# Patient Record
Sex: FEMALE | Race: WHITE | NOT HISPANIC OR LATINO
[De-identification: names, ages, dates, MRNs, and addresses within clinical notes are randomized per-mention and may not be internally consistent; named-entity substitution may affect disease eponyms.]

---

## 2017-10-06 ENCOUNTER — RESULT REVIEW (OUTPATIENT)
Age: 32
End: 2017-10-06

## 2017-12-27 ENCOUNTER — RESULT REVIEW (OUTPATIENT)
Age: 32
End: 2017-12-27

## 2019-12-29 ENCOUNTER — INPATIENT (INPATIENT)
Facility: HOSPITAL | Age: 34
LOS: 1 days | Discharge: HOME | End: 2019-12-31
Attending: OBSTETRICS & GYNECOLOGY | Admitting: OBSTETRICS & GYNECOLOGY
Payer: COMMERCIAL

## 2019-12-29 VITALS — HEIGHT: 68 IN | WEIGHT: 233.69 LBS

## 2019-12-29 DIAGNOSIS — O26.899 OTHER SPECIFIED PREGNANCY RELATED CONDITIONS, UNSPECIFIED TRIMESTER: ICD-10-CM

## 2019-12-29 DIAGNOSIS — Z3A.00 WEEKS OF GESTATION OF PREGNANCY NOT SPECIFIED: ICD-10-CM

## 2019-12-29 DIAGNOSIS — Z34.80 ENCOUNTER FOR SUPERVISION OF OTHER NORMAL PREGNANCY, UNSPECIFIED TRIMESTER: ICD-10-CM

## 2019-12-29 LAB
BASOPHILS # BLD AUTO: 0.02 K/UL — SIGNIFICANT CHANGE UP (ref 0–0.2)
BASOPHILS NFR BLD AUTO: 0.2 % — SIGNIFICANT CHANGE UP (ref 0–2)
BLD GP AB SCN SERPL QL: NEGATIVE — SIGNIFICANT CHANGE UP
EOSINOPHIL # BLD AUTO: 0.19 K/UL — SIGNIFICANT CHANGE UP (ref 0–0.5)
EOSINOPHIL NFR BLD AUTO: 2.4 % — SIGNIFICANT CHANGE UP (ref 0–6)
HCT VFR BLD CALC: 33.6 % — LOW (ref 34.5–45)
HGB BLD-MCNC: 10.9 G/DL — LOW (ref 11.5–15.5)
IMM GRANULOCYTES NFR BLD AUTO: 0.5 % — SIGNIFICANT CHANGE UP (ref 0–1.5)
LYMPHOCYTES # BLD AUTO: 1.28 K/UL — SIGNIFICANT CHANGE UP (ref 1–3.3)
LYMPHOCYTES # BLD AUTO: 15.9 % — SIGNIFICANT CHANGE UP (ref 13–44)
MCHC RBC-ENTMCNC: 30 PG — SIGNIFICANT CHANGE UP (ref 27–34)
MCHC RBC-ENTMCNC: 32.4 GM/DL — SIGNIFICANT CHANGE UP (ref 32–36)
MCV RBC AUTO: 92.6 FL — SIGNIFICANT CHANGE UP (ref 80–100)
MONOCYTES # BLD AUTO: 0.87 K/UL — SIGNIFICANT CHANGE UP (ref 0–0.9)
MONOCYTES NFR BLD AUTO: 10.8 % — SIGNIFICANT CHANGE UP (ref 2–14)
NEUTROPHILS # BLD AUTO: 5.63 K/UL — SIGNIFICANT CHANGE UP (ref 1.8–7.4)
NEUTROPHILS NFR BLD AUTO: 70.2 % — SIGNIFICANT CHANGE UP (ref 43–77)
NRBC # BLD: 0 /100 WBCS — SIGNIFICANT CHANGE UP (ref 0–0)
PLATELET # BLD AUTO: 214 K/UL — SIGNIFICANT CHANGE UP (ref 150–400)
RBC # BLD: 3.63 M/UL — LOW (ref 3.8–5.2)
RBC # FLD: 14.6 % — HIGH (ref 10.3–14.5)
RH IG SCN BLD-IMP: NEGATIVE — SIGNIFICANT CHANGE UP
WBC # BLD: 8.03 K/UL — SIGNIFICANT CHANGE UP (ref 3.8–10.5)
WBC # FLD AUTO: 8.03 K/UL — SIGNIFICANT CHANGE UP (ref 3.8–10.5)

## 2019-12-29 RX ORDER — SODIUM CHLORIDE 9 MG/ML
1000 INJECTION, SOLUTION INTRAVENOUS
Refills: 0 | Status: DISCONTINUED | OUTPATIENT
Start: 2019-12-29 | End: 2019-12-30

## 2019-12-29 RX ORDER — DIBUCAINE 1 %
1 OINTMENT (GRAM) RECTAL EVERY 6 HOURS
Refills: 0 | Status: DISCONTINUED | OUTPATIENT
Start: 2019-12-29 | End: 2019-12-31

## 2019-12-29 RX ORDER — HYDROCORTISONE 1 %
1 OINTMENT (GRAM) TOPICAL EVERY 6 HOURS
Refills: 0 | Status: DISCONTINUED | OUTPATIENT
Start: 2019-12-29 | End: 2019-12-31

## 2019-12-29 RX ORDER — OXYTOCIN 10 UNIT/ML
333.33 VIAL (ML) INJECTION
Qty: 20 | Refills: 0 | Status: DISCONTINUED | OUTPATIENT
Start: 2019-12-29 | End: 2019-12-31

## 2019-12-29 RX ORDER — GLYCERIN ADULT
1 SUPPOSITORY, RECTAL RECTAL AT BEDTIME
Refills: 0 | Status: DISCONTINUED | OUTPATIENT
Start: 2019-12-29 | End: 2019-12-31

## 2019-12-29 RX ORDER — SODIUM CHLORIDE 9 MG/ML
3 INJECTION INTRAMUSCULAR; INTRAVENOUS; SUBCUTANEOUS EVERY 8 HOURS
Refills: 0 | Status: DISCONTINUED | OUTPATIENT
Start: 2019-12-29 | End: 2019-12-31

## 2019-12-29 RX ORDER — AER TRAVELER 0.5 G/1
1 SOLUTION RECTAL; TOPICAL EVERY 4 HOURS
Refills: 0 | Status: DISCONTINUED | OUTPATIENT
Start: 2019-12-29 | End: 2019-12-31

## 2019-12-29 RX ORDER — LANOLIN
1 OINTMENT (GRAM) TOPICAL EVERY 6 HOURS
Refills: 0 | Status: DISCONTINUED | OUTPATIENT
Start: 2019-12-29 | End: 2019-12-31

## 2019-12-29 RX ORDER — OXYCODONE HYDROCHLORIDE 5 MG/1
5 TABLET ORAL
Refills: 0 | Status: DISCONTINUED | OUTPATIENT
Start: 2019-12-29 | End: 2019-12-31

## 2019-12-29 RX ORDER — KETOROLAC TROMETHAMINE 30 MG/ML
30 SYRINGE (ML) INJECTION ONCE
Refills: 0 | Status: DISCONTINUED | OUTPATIENT
Start: 2019-12-29 | End: 2019-12-29

## 2019-12-29 RX ORDER — TETANUS TOXOID, REDUCED DIPHTHERIA TOXOID AND ACELLULAR PERTUSSIS VACCINE, ADSORBED 5; 2.5; 8; 8; 2.5 [IU]/.5ML; [IU]/.5ML; UG/.5ML; UG/.5ML; UG/.5ML
0.5 SUSPENSION INTRAMUSCULAR ONCE
Refills: 0 | Status: DISCONTINUED | OUTPATIENT
Start: 2019-12-29 | End: 2019-12-31

## 2019-12-29 RX ORDER — PRAMOXINE HYDROCHLORIDE 150 MG/15G
1 AEROSOL, FOAM RECTAL EVERY 4 HOURS
Refills: 0 | Status: DISCONTINUED | OUTPATIENT
Start: 2019-12-29 | End: 2019-12-31

## 2019-12-29 RX ORDER — OXYTOCIN 10 UNIT/ML
2 VIAL (ML) INJECTION
Qty: 30 | Refills: 0 | Status: DISCONTINUED | OUTPATIENT
Start: 2019-12-29 | End: 2019-12-31

## 2019-12-29 RX ORDER — IBUPROFEN 200 MG
600 TABLET ORAL EVERY 6 HOURS
Refills: 0 | Status: COMPLETED | OUTPATIENT
Start: 2019-12-29 | End: 2020-11-26

## 2019-12-29 RX ORDER — DIPHENHYDRAMINE HCL 50 MG
25 CAPSULE ORAL EVERY 6 HOURS
Refills: 0 | Status: DISCONTINUED | OUTPATIENT
Start: 2019-12-29 | End: 2019-12-31

## 2019-12-29 RX ORDER — ACETAMINOPHEN 500 MG
975 TABLET ORAL
Refills: 0 | Status: DISCONTINUED | OUTPATIENT
Start: 2019-12-29 | End: 2019-12-31

## 2019-12-29 RX ORDER — SIMETHICONE 80 MG/1
80 TABLET, CHEWABLE ORAL EVERY 4 HOURS
Refills: 0 | Status: DISCONTINUED | OUTPATIENT
Start: 2019-12-29 | End: 2019-12-31

## 2019-12-29 RX ORDER — CITRIC ACID/SODIUM CITRATE 300-500 MG
15 SOLUTION, ORAL ORAL EVERY 6 HOURS
Refills: 0 | Status: DISCONTINUED | OUTPATIENT
Start: 2019-12-29 | End: 2019-12-30

## 2019-12-29 RX ORDER — BENZOCAINE 10 %
1 GEL (GRAM) MUCOUS MEMBRANE EVERY 6 HOURS
Refills: 0 | Status: DISCONTINUED | OUTPATIENT
Start: 2019-12-29 | End: 2019-12-31

## 2019-12-29 RX ORDER — MAGNESIUM HYDROXIDE 400 MG/1
30 TABLET, CHEWABLE ORAL
Refills: 0 | Status: DISCONTINUED | OUTPATIENT
Start: 2019-12-29 | End: 2019-12-31

## 2019-12-29 RX ORDER — OXYCODONE HYDROCHLORIDE 5 MG/1
5 TABLET ORAL ONCE
Refills: 0 | Status: DISCONTINUED | OUTPATIENT
Start: 2019-12-29 | End: 2019-12-31

## 2019-12-29 RX ADMIN — SODIUM CHLORIDE 125 MILLILITER(S): 9 INJECTION, SOLUTION INTRAVENOUS at 20:28

## 2019-12-29 RX ADMIN — Medication 2 MILLIUNIT(S)/MIN: at 20:49

## 2019-12-29 RX ADMIN — Medication 1000 MILLIUNIT(S)/MIN: at 22:47

## 2019-12-29 RX ADMIN — Medication 30 MILLIGRAM(S): at 22:50

## 2019-12-29 NOTE — PRE-ANESTHESIA EVALUATION ADULT - NSANTHOSAYNRD_GEN_A_CORE
No. AJNE screening performed.  STOP BANG Legend: 0-2 = LOW Risk; 3-4 = INTERMEDIATE Risk; 5-8 = HIGH Risk

## 2019-12-30 LAB
HCT VFR BLD CALC: 32.1 % — LOW (ref 34.5–45)
HGB BLD-MCNC: 10.3 G/DL — LOW (ref 11.5–15.5)

## 2019-12-30 PROCEDURE — 86901 BLOOD TYPING SEROLOGIC RH(D): CPT

## 2019-12-30 PROCEDURE — 86900 BLOOD TYPING SEROLOGIC ABO: CPT

## 2019-12-30 PROCEDURE — 85014 HEMATOCRIT: CPT

## 2019-12-30 PROCEDURE — G0463: CPT

## 2019-12-30 PROCEDURE — 59050 FETAL MONITOR W/REPORT: CPT

## 2019-12-30 PROCEDURE — 86780 TREPONEMA PALLIDUM: CPT

## 2019-12-30 PROCEDURE — 59025 FETAL NON-STRESS TEST: CPT

## 2019-12-30 PROCEDURE — 86850 RBC ANTIBODY SCREEN: CPT

## 2019-12-30 PROCEDURE — 85027 COMPLETE CBC AUTOMATED: CPT

## 2019-12-30 PROCEDURE — 85018 HEMOGLOBIN: CPT

## 2019-12-30 RX ORDER — IBUPROFEN 200 MG
600 TABLET ORAL EVERY 6 HOURS
Refills: 0 | Status: DISCONTINUED | OUTPATIENT
Start: 2019-12-30 | End: 2019-12-31

## 2019-12-30 RX ADMIN — Medication 975 MILLIGRAM(S): at 09:33

## 2019-12-30 RX ADMIN — Medication 975 MILLIGRAM(S): at 14:44

## 2019-12-30 RX ADMIN — Medication 975 MILLIGRAM(S): at 10:15

## 2019-12-30 RX ADMIN — Medication 975 MILLIGRAM(S): at 15:15

## 2019-12-30 RX ADMIN — Medication 600 MILLIGRAM(S): at 06:29

## 2019-12-30 RX ADMIN — Medication 1 TABLET(S): at 11:46

## 2019-12-30 RX ADMIN — Medication 600 MILLIGRAM(S): at 12:15

## 2019-12-30 RX ADMIN — Medication 975 MILLIGRAM(S): at 03:12

## 2019-12-30 RX ADMIN — Medication 600 MILLIGRAM(S): at 11:46

## 2019-12-30 RX ADMIN — Medication 975 MILLIGRAM(S): at 03:42

## 2019-12-30 RX ADMIN — Medication 600 MILLIGRAM(S): at 06:59

## 2019-12-30 RX ADMIN — Medication 600 MILLIGRAM(S): at 17:25

## 2019-12-30 NOTE — PROGRESS NOTE ADULT - PROBLEM SELECTOR PLAN 1
- Pain well controlled, continue current pain regimen  - Continue ambulation, SCDs when not ambulating  - Continue regular diet  - f/u AM H/H    Irma Moon, PGY-1

## 2019-12-31 ENCOUNTER — TRANSCRIPTION ENCOUNTER (OUTPATIENT)
Age: 34
End: 2019-12-31

## 2019-12-31 VITALS
TEMPERATURE: 98 F | RESPIRATION RATE: 18 BRPM | DIASTOLIC BLOOD PRESSURE: 76 MMHG | SYSTOLIC BLOOD PRESSURE: 113 MMHG | HEART RATE: 80 BPM

## 2019-12-31 NOTE — PROGRESS NOTE ADULT - SUBJECTIVE AND OBJECTIVE BOX
S: Patient doing well. Minimal lochia. Pain controlled.    O: Vital Signs Last 24 Hrs  T(C): 36.8 (31 Dec 2019 06:05), Max: 37 (30 Dec 2019 13:19)  T(F): 98.3 (31 Dec 2019 06:05), Max: 98.6 (30 Dec 2019 13:19)  HR: 80 (31 Dec 2019 06:05) (80 - 88)  BP: 113/76 (31 Dec 2019 06:05) (113/76 - 129/83)  BP(mean): --  RR: 18 (31 Dec 2019 06:05) (18 - 18)  SpO2: 98% (30 Dec 2019 17:01) (98% - 98%)    Gen: NAD  Abd: soft, NT, ND, fundus firm below umbilicus  Lochia: moderate  Ext: no tenderness    Labs:                        10.3   x     )-----------( x        ( 30 Dec 2019 09:00 )             32.1       A: 34y PPD#2 s/p  doing well.    Plan:  regular diet  pain rx upon request  routine pp care  nothing per vagina  jkmr
OB Progress Note:  PPD#1    S: 33yo PPD#1 s/p . Patient feels well. Pain is well controlled. She is tolerating a regular diet and passing flatus. She is voiding spontaneously, and ambulating without difficulty. Endorses light vaginal bleeding, soaking less than 1 pad/hour. Denies CP/SOB. Denies lightheadedness/dizziness. Denies N/V.     O:  Vitals:  Vital Signs Last 24 Hrs  T(C): 36.5 (30 Dec 2019 00:50), Max: 37.2 (29 Dec 2019 22:50)  T(F): 97.7 (30 Dec 2019 00:50), Max: 99 (29 Dec 2019 22:50)  HR: 86 (30 Dec 2019 00:50) (82 - 94)  BP: 118/59 (30 Dec 2019 00:50) (116/56 - 135/60)  BP(mean): --  RR: 19 (30 Dec 2019 00:50) (17 - 19)  SpO2: 95% (30 Dec 2019 00:50) (94% - 97%)    MEDICATIONS  (STANDING):  acetaminophen   Tablet .. 975 milliGRAM(s) Oral <User Schedule>  citric acid/sodium citrate Solution 15 milliLiter(s) Oral every 6 hours  dextrose 5% + lactated ringers. 1000 milliLiter(s) (125 mL/Hr) IV Continuous <Continuous>  diphtheria/tetanus/pertussis (acellular) Vaccine (ADAcel) 0.5 milliLiter(s) IntraMuscular once  ibuprofen  Tablet. 600 milliGRAM(s) Oral every 6 hours  lactated ringers. 1000 milliLiter(s) (125 mL/Hr) IV Continuous <Continuous>  oxytocin Infusion 333.333 milliUNIT(s)/Min (1000 mL/Hr) IV Continuous <Continuous>  oxytocin Infusion 2 milliUNIT(s)/Min (2 mL/Hr) IV Continuous <Continuous>  oxytocin Infusion 333.333 milliUNIT(s)/Min (1000 mL/Hr) IV Continuous <Continuous>  prenatal multivitamin 1 Tablet(s) Oral daily  sodium chloride 0.9% lock flush 3 milliLiter(s) IV Push every 8 hours      Labs:  Blood type: A Negative  Rubella IgG: RPR:                           10.9<L>   8.03 >-----------< 214    (  @ 18:53 )             33.6<L>                  Physical Exam:  General: NAD  Heart: all extremities well perfused  Lungs: breathing comfortably  Abdomen: soft, non-tender, non-distended, fundus firm  Vaginal: lochia wnl  Extremities: No calf tenderness or erythema

## 2019-12-31 NOTE — DISCHARGE NOTE OB - PATIENT PORTAL LINK FT
You can access the FollowMyHealth Patient Portal offered by [facility] by registering at the following website: [email or web address]. By joining [facility]’s FollowMyHealth portal, you will also be able to view your health information using other applications (apps) compatible with our system.

## 2019-12-31 NOTE — DISCHARGE NOTE OB - CARE PROVIDER_API CALL
Lilli Hartman)  Obstetrics and Gynecology  [address] [facility], [address] [place] [address]  Phone: [phone]  Fax: [phone]  Follow Up Time: Routine

## 2019-12-31 NOTE — PROGRESS NOTE ADULT - ASSESSMENT
Patient doing well  ppd2  happy   bonding with baby  no complaints  reviewed perineal care  discharged home  fu in 6 weeks in office
A/P: 33yo PPD#1 s/p .  Patient is stable and doing well post-partum.

## 2019-12-31 NOTE — DISCHARGE NOTE OB - MATERIALS PROVIDED
[facility] [place] Screening Program/Breastfeeding Mother’s Support Group Information/Back To Sleep Handout/Shaken Baby Prevention Handout/Birth Certificate Instructions/[facility] Hearing Screen Program

## 2019-12-31 NOTE — DISCHARGE NOTE OB - CARE PLAN
Principal Discharge DX:	 (normal spontaneous vaginal delivery)  Goal:	routine pp care, nothing per vagina  Assessment and plan of treatment:	regular diet   nothing per vagina x 6 weeks  fu 6 weeks

## 2020-01-02 LAB — T PALLIDUM AB TITR SER: NEGATIVE — SIGNIFICANT CHANGE UP

## 2020-07-22 ENCOUNTER — RESULT REVIEW (OUTPATIENT)
Age: 35
End: 2020-07-22

## 2021-01-24 ENCOUNTER — TRANSCRIPTION ENCOUNTER (OUTPATIENT)
Age: 36
End: 2021-01-24